# Patient Record
Sex: MALE | Race: WHITE | NOT HISPANIC OR LATINO
[De-identification: names, ages, dates, MRNs, and addresses within clinical notes are randomized per-mention and may not be internally consistent; named-entity substitution may affect disease eponyms.]

---

## 2022-01-20 ENCOUNTER — LABORATORY RESULT (OUTPATIENT)
Age: 52
End: 2022-01-20

## 2022-01-20 ENCOUNTER — NON-APPOINTMENT (OUTPATIENT)
Age: 52
End: 2022-01-20

## 2022-01-20 ENCOUNTER — APPOINTMENT (OUTPATIENT)
Dept: HEART AND VASCULAR | Facility: CLINIC | Age: 52
End: 2022-01-20
Payer: COMMERCIAL

## 2022-01-20 VITALS
RESPIRATION RATE: 12 BRPM | WEIGHT: 230 LBS | HEIGHT: 71 IN | DIASTOLIC BLOOD PRESSURE: 95 MMHG | SYSTOLIC BLOOD PRESSURE: 160 MMHG | BODY MASS INDEX: 32.2 KG/M2 | HEART RATE: 60 BPM

## 2022-01-20 DIAGNOSIS — Z83.3 FAMILY HISTORY OF DIABETES MELLITUS: ICD-10-CM

## 2022-01-20 DIAGNOSIS — Z78.9 OTHER SPECIFIED HEALTH STATUS: ICD-10-CM

## 2022-01-20 DIAGNOSIS — Z82.49 FAMILY HISTORY OF ISCHEMIC HEART DISEASE AND OTHER DISEASES OF THE CIRCULATORY SYSTEM: ICD-10-CM

## 2022-01-20 DIAGNOSIS — R00.2 PALPITATIONS: ICD-10-CM

## 2022-01-20 DIAGNOSIS — Z00.00 ENCOUNTER FOR GENERAL ADULT MEDICAL EXAMINATION W/OUT ABNORMAL FINDINGS: ICD-10-CM

## 2022-01-20 PROCEDURE — 36415 COLL VENOUS BLD VENIPUNCTURE: CPT

## 2022-01-20 PROCEDURE — 93000 ELECTROCARDIOGRAM COMPLETE: CPT

## 2022-01-20 PROCEDURE — 93306 TTE W/DOPPLER COMPLETE: CPT

## 2022-01-20 PROCEDURE — ZZZZZ: CPT

## 2022-01-20 PROCEDURE — 99204 OFFICE O/P NEW MOD 45 MIN: CPT

## 2022-01-21 ENCOUNTER — NON-APPOINTMENT (OUTPATIENT)
Age: 52
End: 2022-01-21

## 2022-01-21 NOTE — DISCUSSION/SUMMARY
[Hypertension] : hypertension [Low Sodium Diet] : low sodium diet [NSAIDs Avoidance] : non-steroidal anti-inflammatory drugs avoidance [None] : There are no changes in medication management [Patient] : the patient [Continue] : continuing ACE inhibitors [Begin] : beginning calcium channel blockers [de-identified] : MCAT monitoring [FreeTextEntry1] : Cardiac Murmur - Stable; no further intervention at this time (see echo). \par Chest tightness- Episodes may be related to underlying ischemia; Given baseline ECG with LVH and repolarization abnormalities will obtain Nuc stress to r/o underlying ischemic CAD.\par Blood work drawn. Maintain a low caloric, low sodium, low cholesterol diet. Dietary counseling given, diet and exercise discussed in detail.

## 2022-01-21 NOTE — PHYSICAL EXAM

## 2022-01-21 NOTE — ADDENDUM
[FreeTextEntry1] : Documented by Jonatan Coker acting as a scribe for Dr. Brian Sosa on 01/20/2022.

## 2022-01-21 NOTE — HISTORY OF PRESENT ILLNESS
[FreeTextEntry1] : Beginning 6 months ago, the Pt. c/o episodes of heart racing, "fluttering" , lasting variable amounts of time (5 to 20 min.), without a precipitant or maneuver to relieve the episode. These episodes occur both with and without exertion and are increasing in frequency over the last 2 weeks. The Pt. occasionally has chest tightness associated with these episodes. He denies VELASCO/SOB, orthopnea, lower extremity edema, dizziness, fatigue, diaphoresis. The patient considers this a change in their clinical condition.\par HTN / cardiac murmur/ Palpitations - Echo ordered to assess LV function/possible valvular heart disease. \par

## 2022-01-21 NOTE — END OF VISIT
[FreeTextEntry3] : All medical record entries made by the Scribe were at my, Dr. Brian Sosa, direction and personally dictated by me on 01/20/2022. I have reviewed the chart and agree that the record accurately reflects my personal performance of the history, physical exam, assessment and plan. I have also personally directed, reviewed, and agreed with the chart.  [Time Spent: ___ minutes] : I have spent [unfilled] minutes of time on the encounter.

## 2022-01-24 LAB
25(OH)D3 SERPL-MCNC: 14.9 NG/ML
ALBUMIN SERPL ELPH-MCNC: 4.9 G/DL
ALP BLD-CCNC: 76 U/L
ALT SERPL-CCNC: 38 U/L
ANION GAP SERPL CALC-SCNC: 14 MMOL/L
AST SERPL-CCNC: 30 U/L
BASOPHILS # BLD AUTO: 0.03 K/UL
BASOPHILS NFR BLD AUTO: 0.4 %
BILIRUB SERPL-MCNC: 0.6 MG/DL
BUN SERPL-MCNC: 19 MG/DL
CALCIUM SERPL-MCNC: 9.5 MG/DL
CHLORIDE SERPL-SCNC: 106 MMOL/L
CHOLEST SERPL-MCNC: 200 MG/DL
CO2 SERPL-SCNC: 21 MMOL/L
COVID-19 SPIKE DOMAIN ANTIBODY INTERPRETATION: POSITIVE
CREAT SERPL-MCNC: 1.07 MG/DL
EOSINOPHIL # BLD AUTO: 0.11 K/UL
EOSINOPHIL NFR BLD AUTO: 1.6 %
ESTIMATED AVERAGE GLUCOSE: 108 MG/DL
FOLATE SERPL-MCNC: 5.8 NG/ML
GLUCOSE SERPL-MCNC: 109 MG/DL
HBA1C MFR BLD HPLC: 5.4 %
HCT VFR BLD CALC: 49 %
HDLC SERPL-MCNC: 37 MG/DL
HGB BLD-MCNC: 16.2 G/DL
IMM GRANULOCYTES NFR BLD AUTO: 0.4 %
LDLC SERPL CALC-MCNC: 116 MG/DL
LYMPHOCYTES # BLD AUTO: 1.64 K/UL
LYMPHOCYTES NFR BLD AUTO: 24.2 %
MAN DIFF?: NORMAL
MCHC RBC-ENTMCNC: 29.3 PG
MCHC RBC-ENTMCNC: 33.1 GM/DL
MCV RBC AUTO: 88.6 FL
MONOCYTES # BLD AUTO: 0.37 K/UL
MONOCYTES NFR BLD AUTO: 5.5 %
NEUTROPHILS # BLD AUTO: 4.59 K/UL
NEUTROPHILS NFR BLD AUTO: 67.9 %
NONHDLC SERPL-MCNC: 162 MG/DL
PLATELET # BLD AUTO: 154 K/UL
POTASSIUM SERPL-SCNC: 4.4 MMOL/L
PROT SERPL-MCNC: 7.3 G/DL
PSA FREE FLD-MCNC: 27 %
PSA FREE SERPL-MCNC: 0.15 NG/ML
PSA SERPL-MCNC: 0.56 NG/ML
RBC # BLD: 5.53 M/UL
RBC # FLD: 12.5 %
SARS-COV-2 AB SERPL IA-ACNC: >250 U/ML
SODIUM SERPL-SCNC: 141 MMOL/L
T3 SERPL-MCNC: 75 NG/DL
T3FREE SERPL-MCNC: 2.71 PG/ML
T3RU NFR SERPL: 1 TBI
T4 FREE SERPL-MCNC: 1 NG/DL
T4 SERPL-MCNC: 5.2 UG/DL
TRIGL SERPL-MCNC: 229 MG/DL
TSH SERPL-ACNC: 1.37 UIU/ML
VIT B12 SERPL-MCNC: 389 PG/ML
WBC # FLD AUTO: 6.77 K/UL

## 2022-01-27 ENCOUNTER — APPOINTMENT (OUTPATIENT)
Dept: HEART AND VASCULAR | Facility: CLINIC | Age: 52
End: 2022-01-27
Payer: COMMERCIAL

## 2022-01-27 DIAGNOSIS — R07.89 OTHER CHEST PAIN: ICD-10-CM

## 2022-01-27 PROCEDURE — 78452 HT MUSCLE IMAGE SPECT MULT: CPT

## 2022-01-27 PROCEDURE — A9500: CPT

## 2022-01-27 PROCEDURE — 93015 CV STRESS TEST SUPVJ I&R: CPT

## 2022-01-27 PROCEDURE — 96374 THER/PROPH/DIAG INJ IV PUSH: CPT | Mod: 59

## 2022-02-03 VITALS
DIASTOLIC BLOOD PRESSURE: 90 MMHG | HEIGHT: 71 IN | SYSTOLIC BLOOD PRESSURE: 146 MMHG | HEART RATE: 69 BPM | WEIGHT: 225 LBS | BODY MASS INDEX: 31.5 KG/M2

## 2022-02-03 PROBLEM — R07.89 CHEST TIGHTNESS: Status: ACTIVE | Noted: 2022-01-20

## 2022-02-10 ENCOUNTER — NON-APPOINTMENT (OUTPATIENT)
Age: 52
End: 2022-02-10

## 2022-02-10 ENCOUNTER — APPOINTMENT (OUTPATIENT)
Dept: HEART AND VASCULAR | Facility: CLINIC | Age: 52
End: 2022-02-10
Payer: COMMERCIAL

## 2022-02-10 VITALS
SYSTOLIC BLOOD PRESSURE: 150 MMHG | BODY MASS INDEX: 31.36 KG/M2 | HEIGHT: 71 IN | WEIGHT: 224 LBS | DIASTOLIC BLOOD PRESSURE: 90 MMHG | HEART RATE: 61 BPM | RESPIRATION RATE: 12 BRPM

## 2022-02-10 PROCEDURE — 93000 ELECTROCARDIOGRAM COMPLETE: CPT

## 2022-02-10 PROCEDURE — 99214 OFFICE O/P EST MOD 30 MIN: CPT

## 2022-02-11 ENCOUNTER — NON-APPOINTMENT (OUTPATIENT)
Age: 52
End: 2022-02-11

## 2022-02-11 NOTE — END OF VISIT
[Time Spent: ___ minutes] : I have spent [unfilled] minutes of time on the encounter. [FreeTextEntry3] : All medical record entries made by the Scribe were at my, Dr. Brian Sosa, direction and personally dictated by me on 02/10/2022. I have reviewed the chart and agree that the record accurately reflects my personal performance of the history, physical exam, assessment and plan. I have also personally directed, reviewed, and agreed with the chart.

## 2022-02-11 NOTE — HISTORY OF PRESENT ILLNESS
[FreeTextEntry1] : Patient denies Chest Pain, SOB, Dizziness, Leg edema, Orthopnea, PND, Palpitations, Syncope, VELASCO, Diaphoresis. \par

## 2022-02-11 NOTE — DISCUSSION/SUMMARY
[Hypertension] : hypertension [Low Sodium Diet] : low sodium diet [NSAIDs Avoidance] : non-steroidal anti-inflammatory drugs avoidance [Patient] : the patient [Medication Changes Per Orders] : Medication changes are as documented in orders [Increase] : increasing ACE inhibitors [Continue] : continuing calcium channel blockers [FreeTextEntry1] : Cardiac Murmur -  Stable; no further intervention at this time (see last echo). \par Blood work reviewed with patient. Maintain a low caloric, low sodium, low cholesterol diet. Dietary counseling given, diet and exercise discussed in detail.

## 2022-02-11 NOTE — PHYSICAL EXAM
[Well Developed] : well developed [Well Nourished] : well nourished [No Acute Distress] : no acute distress [Normal Conjunctiva] : normal conjunctiva [Normal Venous Pressure] : normal venous pressure [Normal S1, S2] : normal S1, S2 [No Rub] : no rub [No Gallop] : no gallop [Murmur] : murmur [Clear Lung Fields] : clear lung fields [Good Air Entry] : good air entry [No Respiratory Distress] : no respiratory distress  [Soft] : abdomen soft [Non Tender] : non-tender [No Masses/organomegaly] : no masses/organomegaly [Normal Bowel Sounds] : normal bowel sounds [Normal Gait] : normal gait [No Edema] : no edema [No Cyanosis] : no cyanosis [No Clubbing] : no clubbing [No Varicosities] : no varicosities [No Rash] : no rash [No Skin Lesions] : no skin lesions [Moves all extremities] : moves all extremities [No Focal Deficits] : no focal deficits [Normal Speech] : normal speech [Alert and Oriented] : alert and oriented [Normal memory] : normal memory [de-identified] : 2/6 HARRISON --> Axilla

## 2022-05-03 ENCOUNTER — RX RENEWAL (OUTPATIENT)
Age: 52
End: 2022-05-03

## 2022-06-15 ENCOUNTER — TRANSCRIPTION ENCOUNTER (OUTPATIENT)
Age: 52
End: 2022-06-15

## 2022-06-29 ENCOUNTER — RX RENEWAL (OUTPATIENT)
Age: 52
End: 2022-06-29

## 2022-08-11 ENCOUNTER — APPOINTMENT (OUTPATIENT)
Dept: HEART AND VASCULAR | Facility: CLINIC | Age: 52
End: 2022-08-11

## 2022-08-11 ENCOUNTER — LABORATORY RESULT (OUTPATIENT)
Age: 52
End: 2022-08-11

## 2022-08-11 ENCOUNTER — NON-APPOINTMENT (OUTPATIENT)
Age: 52
End: 2022-08-11

## 2022-08-11 VITALS
HEART RATE: 64 BPM | DIASTOLIC BLOOD PRESSURE: 90 MMHG | BODY MASS INDEX: 32.7 KG/M2 | SYSTOLIC BLOOD PRESSURE: 138 MMHG | HEIGHT: 71 IN | RESPIRATION RATE: 16 BRPM | WEIGHT: 233.6 LBS

## 2022-08-11 VITALS — SYSTOLIC BLOOD PRESSURE: 142 MMHG | DIASTOLIC BLOOD PRESSURE: 90 MMHG

## 2022-08-11 DIAGNOSIS — R01.1 CARDIAC MURMUR, UNSPECIFIED: ICD-10-CM

## 2022-08-11 DIAGNOSIS — I10 ESSENTIAL (PRIMARY) HYPERTENSION: ICD-10-CM

## 2022-08-11 PROCEDURE — 99214 OFFICE O/P EST MOD 30 MIN: CPT | Mod: 25

## 2022-08-11 PROCEDURE — 93000 ELECTROCARDIOGRAM COMPLETE: CPT

## 2022-08-11 PROCEDURE — 36415 COLL VENOUS BLD VENIPUNCTURE: CPT

## 2022-08-11 RX ORDER — LISINOPRIL 20 MG/1
20 TABLET ORAL DAILY
Qty: 90 | Refills: 3 | Status: DISCONTINUED | COMMUNITY
Start: 1900-01-01 | End: 2022-08-11

## 2022-08-11 RX ORDER — AMLODIPINE BESYLATE 5 MG/1
5 TABLET ORAL
Qty: 90 | Refills: 0 | Status: DISCONTINUED | COMMUNITY
Start: 2022-01-20 | End: 2022-08-11

## 2022-08-11 NOTE — DISCUSSION/SUMMARY
[Hypertension] : hypertension [Stable] : stable [Low Sodium Diet] : low sodium diet [NSAIDs Avoidance] : non-steroidal anti-inflammatory drugs avoidance [Patient] : the patient [Increase] : increasing calcium channel blockers [FreeTextEntry1] : Cardiac Murmur - Stable; no further intervention at this time (see echo). \par Maintain a low caloric, low sodium, low cholesterol diet. Dietary counseling given, diet and exercise discussed in detail.

## 2022-08-11 NOTE — END OF VISIT
[FreeTextEntry3] : All medical record entries made by the Scribe were at my, Dr. Brian Sosa, direction and personally dictated by me on 08/11/2022. I have reviewed the chart and agree that the record accurately reflects my personal performance of the history, physical exam, assessment and plan. I have also personally directed, reviewed, and agreed with the chart.  [Time Spent: ___ minutes] : I have spent [unfilled] minutes of time on the encounter.

## 2022-08-11 NOTE — HISTORY OF PRESENT ILLNESS
[FreeTextEntry1] : Denies Chest Pain, SOB, Dizziness, Leg edema, Orthopnea, PND, Palpitations, Syncope, VELASCO, Diaphoresis.

## 2022-08-11 NOTE — PHYSICAL EXAM

## 2022-08-15 LAB
25(OH)D3 SERPL-MCNC: 14.9 NG/ML
ALBUMIN SERPL ELPH-MCNC: 5 G/DL
ALP BLD-CCNC: 66 U/L
ALT SERPL-CCNC: 157 U/L
ANION GAP SERPL CALC-SCNC: 14 MMOL/L
AST SERPL-CCNC: 66 U/L
BASOPHILS # BLD AUTO: 0.04 K/UL
BASOPHILS NFR BLD AUTO: 0.6 %
BILIRUB SERPL-MCNC: 0.7 MG/DL
BUN SERPL-MCNC: 18 MG/DL
CALCIUM SERPL-MCNC: 9.5 MG/DL
CHLORIDE SERPL-SCNC: 107 MMOL/L
CHOLEST SERPL-MCNC: 219 MG/DL
CO2 SERPL-SCNC: 22 MMOL/L
COVID-19 SPIKE DOMAIN ANTIBODY INTERPRETATION: POSITIVE
CREAT SERPL-MCNC: 1.15 MG/DL
EGFR: 76 ML/MIN/1.73M2
EOSINOPHIL # BLD AUTO: 0.09 K/UL
EOSINOPHIL NFR BLD AUTO: 1.3 %
ESTIMATED AVERAGE GLUCOSE: 111 MG/DL
FOLATE SERPL-MCNC: 7.2 NG/ML
GLUCOSE SERPL-MCNC: 82 MG/DL
HBA1C MFR BLD HPLC: 5.5 %
HCT VFR BLD CALC: 48.9 %
HDLC SERPL-MCNC: 38 MG/DL
HGB BLD-MCNC: 16.6 G/DL
IMM GRANULOCYTES NFR BLD AUTO: 0.3 %
LDLC SERPL CALC-MCNC: 143 MG/DL
LYMPHOCYTES # BLD AUTO: 1.93 K/UL
LYMPHOCYTES NFR BLD AUTO: 27 %
MAN DIFF?: NORMAL
MCHC RBC-ENTMCNC: 31.6 PG
MCHC RBC-ENTMCNC: 33.9 GM/DL
MCV RBC AUTO: 93 FL
MONOCYTES # BLD AUTO: 0.5 K/UL
MONOCYTES NFR BLD AUTO: 7 %
NEUTROPHILS # BLD AUTO: 4.56 K/UL
NEUTROPHILS NFR BLD AUTO: 63.8 %
NONHDLC SERPL-MCNC: 181 MG/DL
PLATELET # BLD AUTO: 147 K/UL
POTASSIUM SERPL-SCNC: 4.2 MMOL/L
PROT SERPL-MCNC: 7.1 G/DL
RBC # BLD: 5.26 M/UL
RBC # FLD: 13.3 %
SARS-COV-2 AB SERPL IA-ACNC: >250 U/ML
SODIUM SERPL-SCNC: 142 MMOL/L
T3 SERPL-MCNC: 81 NG/DL
T3RU NFR SERPL: 1 TBI
T4 FREE SERPL-MCNC: 1 NG/DL
T4 SERPL-MCNC: 5.2 UG/DL
TRIGL SERPL-MCNC: 193 MG/DL
TSH SERPL-ACNC: 1.1 UIU/ML
VIT B12 SERPL-MCNC: 526 PG/ML
WBC # FLD AUTO: 7.14 K/UL

## 2022-08-22 ENCOUNTER — NON-APPOINTMENT (OUTPATIENT)
Age: 52
End: 2022-08-22

## 2022-11-15 ENCOUNTER — NON-APPOINTMENT (OUTPATIENT)
Age: 52
End: 2022-11-15

## 2023-02-16 ENCOUNTER — APPOINTMENT (OUTPATIENT)
Dept: HEART AND VASCULAR | Facility: CLINIC | Age: 53
End: 2023-02-16

## 2023-08-28 ENCOUNTER — RX RENEWAL (OUTPATIENT)
Age: 53
End: 2023-08-28

## 2023-08-28 RX ORDER — AMLODIPINE BESYLATE AND BENAZEPRIL HYDROCHLORIDE 10; 40 MG/1; MG/1
10-40 CAPSULE ORAL
Qty: 90 | Refills: 0 | Status: ACTIVE | COMMUNITY
Start: 2022-08-11 | End: 1900-01-01

## 2023-09-06 ENCOUNTER — RX RENEWAL (OUTPATIENT)
Age: 53
End: 2023-09-06